# Patient Record
Sex: FEMALE | HISPANIC OR LATINO | Employment: UNEMPLOYED | URBAN - METROPOLITAN AREA
[De-identification: names, ages, dates, MRNs, and addresses within clinical notes are randomized per-mention and may not be internally consistent; named-entity substitution may affect disease eponyms.]

---

## 2017-11-29 ENCOUNTER — IMMUNIZATION (OUTPATIENT)
Dept: INTERNAL MEDICINE | Facility: CLINIC | Age: 32
End: 2017-11-29
Payer: COMMERCIAL

## 2017-11-29 ENCOUNTER — OFFICE VISIT (OUTPATIENT)
Dept: INTERNAL MEDICINE | Facility: CLINIC | Age: 32
End: 2017-11-29
Payer: COMMERCIAL

## 2017-11-29 VITALS
TEMPERATURE: 99 F | OXYGEN SATURATION: 99 % | HEIGHT: 66 IN | BODY MASS INDEX: 24.91 KG/M2 | DIASTOLIC BLOOD PRESSURE: 72 MMHG | SYSTOLIC BLOOD PRESSURE: 122 MMHG | HEART RATE: 65 BPM | WEIGHT: 155 LBS

## 2017-11-29 DIAGNOSIS — Z00.00 ANNUAL PHYSICAL EXAM: Primary | ICD-10-CM

## 2017-11-29 DIAGNOSIS — R93.89 ABNORMAL PELVIC ULTRASOUND: ICD-10-CM

## 2017-11-29 PROCEDURE — 99999 PR PBB SHADOW E&M-NEW PATIENT-LVL IV: CPT | Mod: PBBFAC,,, | Performed by: INTERNAL MEDICINE

## 2017-11-29 PROCEDURE — 99385 PREV VISIT NEW AGE 18-39: CPT | Mod: S$GLB,,, | Performed by: INTERNAL MEDICINE

## 2017-11-29 PROCEDURE — 90471 IMMUNIZATION ADMIN: CPT | Mod: S$GLB,,, | Performed by: INTERNAL MEDICINE

## 2017-11-29 PROCEDURE — 90686 IIV4 VACC NO PRSV 0.5 ML IM: CPT | Mod: S$GLB,,, | Performed by: INTERNAL MEDICINE

## 2017-11-29 NOTE — PROGRESS NOTES
Subjective:       Patient ID: Makenna Shelley is a 32 y.o. female.    Chief Complaint: Establish Care    HPI   Native [place].   Hx obtained through interpretor.  She feels well.    Has an allergy to animals.        Last pap about 1 year ago.  Was told then she may have had cyst or fibroid and f/u U/S recommended.  Normal menses.   has had vasectomy    Review of Systems   Constitutional: Negative for fever and unexpected weight change.   HENT: Negative for congestion and postnasal drip.    Respiratory: Negative for chest tightness, shortness of breath and wheezing.    Cardiovascular: Negative for chest pain and leg swelling.   Gastrointestinal: Negative for abdominal pain, anal bleeding, constipation, diarrhea, nausea and vomiting.   Genitourinary: Negative for dysuria and urgency.   Skin: Negative for rash.   Neurological: Negative for headaches.   Psychiatric/Behavioral: Negative for dysphoric mood and sleep disturbance. The patient is not nervous/anxious.        Objective:      Physical Exam   Constitutional: She is oriented to person, place, and time. She appears well-developed and well-nourished. No distress.   HENT:   Head: Normocephalic and atraumatic.   Right Ear: External ear normal.   Left Ear: External ear normal.   Nose: Nose normal.   Mouth/Throat: Oropharynx is clear and moist.   Eyes: Conjunctivae and EOM are normal. Pupils are equal, round, and reactive to light. Right eye exhibits no discharge. Left eye exhibits no discharge. No scleral icterus.   Neck: Normal range of motion. Neck supple. No JVD present. No thyromegaly present.   Cardiovascular: Normal rate, regular rhythm and normal heart sounds.  Exam reveals no gallop.    No murmur heard.  Pulmonary/Chest: Effort normal and breath sounds normal. No respiratory distress. She has no wheezes. She has no rales.   Abdominal: Soft. Bowel sounds are normal. She exhibits no distension and no mass. There is no tenderness. There is no rebound  and no guarding.   Musculoskeletal: Normal range of motion. She exhibits no edema or tenderness.   Lymphadenopathy:     She has no cervical adenopathy.   Neurological: She is alert and oriented to person, place, and time. No cranial nerve deficit. Coordination normal.   Skin: Skin is warm and dry. No rash noted.   Psychiatric: She has a normal mood and affect. Her behavior is normal. Judgment and thought content normal.       Assessment:       1. Annual physical exam    2. Abnormal pelvic ultrasound        Plan:       Makenna was seen today for establish care.    Diagnoses and all orders for this visit:    Annual physical exam  -     Comprehensive metabolic panel; Future  -     CBC auto differential; Future  -     Lipid panel; Future    Abnormal pelvic ultrasound  -     US Pelvis Complete Non OB; Future  -     Ambulatory consult to Gynecology       regular exercise.

## 2017-12-05 ENCOUNTER — HOSPITAL ENCOUNTER (OUTPATIENT)
Dept: RADIOLOGY | Facility: HOSPITAL | Age: 32
Discharge: HOME | End: 2017-12-05
Attending: INTERNAL MEDICINE
Payer: COMMERCIAL

## 2017-12-05 DIAGNOSIS — R93.89 ABNORMAL PELVIC ULTRASOUND: ICD-10-CM

## 2017-12-05 PROCEDURE — 76856 US EXAM PELVIC COMPLETE: CPT | Mod: 26,,, | Performed by: RADIOLOGY

## 2017-12-05 PROCEDURE — 76830 TRANSVAGINAL US NON-OB: CPT | Mod: 26,,, | Performed by: RADIOLOGY

## 2017-12-05 PROCEDURE — 76856 US EXAM PELVIC COMPLETE: CPT | Mod: TC

## 2017-12-12 ENCOUNTER — OFFICE VISIT (OUTPATIENT)
Dept: UROGYNECOLOGY | Facility: CLINIC | Age: 32
End: 2017-12-12
Payer: COMMERCIAL

## 2017-12-12 VITALS
WEIGHT: 155.19 LBS | HEIGHT: 60 IN | BODY MASS INDEX: 30.47 KG/M2 | DIASTOLIC BLOOD PRESSURE: 86 MMHG | SYSTOLIC BLOOD PRESSURE: 100 MMHG

## 2017-12-12 DIAGNOSIS — D25.9 UTERINE LEIOMYOMA, UNSPECIFIED LOCATION: ICD-10-CM

## 2017-12-12 DIAGNOSIS — Z12.4 ENCOUNTER FOR SCREENING FOR CERVICAL CANCER: ICD-10-CM

## 2017-12-12 DIAGNOSIS — Z01.419 WELL WOMAN EXAM: Primary | ICD-10-CM

## 2017-12-12 PROCEDURE — 99999 PR PBB SHADOW E&M-EST. PATIENT-LVL III: CPT | Mod: PBBFAC,,, | Performed by: NURSE PRACTITIONER

## 2017-12-12 PROCEDURE — 87624 HPV HI-RISK TYP POOLED RSLT: CPT

## 2017-12-12 PROCEDURE — 88175 CYTOPATH C/V AUTO FLUID REDO: CPT | Performed by: PATHOLOGY

## 2017-12-12 PROCEDURE — 88141 CYTOPATH C/V INTERPRET: CPT | Mod: ,,, | Performed by: PATHOLOGY

## 2017-12-12 PROCEDURE — 99385 PREV VISIT NEW AGE 18-39: CPT | Mod: S$GLB,,, | Performed by: NURSE PRACTITIONER

## 2017-12-12 NOTE — PATIENT INSTRUCTIONS
1. Well woman  --pap today  --takes 2-3 weeks for results    2. Uterine fibroid  -- no issue at this time    3. RTC 1 year for annual

## 2017-12-12 NOTE — PROGRESS NOTES
2017    SUBJECTIVE:   32 y.o. female for annual exam.    History reviewed. No pertinent past medical history.    History reviewed. No pertinent surgical history.    Current Outpatient Prescriptions   Medication Sig Dispense Refill    DILTIAZEM HCL (DILTIAZEM 2% CREAM) APPLY TOPICALLY TO ANAL AREA THREE TIMES DAILY.  0     No current facility-administered medications for this visit.      Allergies: Patient has no known allergies.   Patient's last menstrual period was 2017. Normal cycles lasting approximately 5 days.  Denies menorrhagia.   had vasectomy.    OB History      Para Term  AB Living    0 0 0 0 0 0    SAB TAB Ectopic Multiple Live Births    0 0 0 0 0            Well Woman:  Pap:Denies history of abnormal pap smear.  Last pap one year ago  Mammo:n/a  Colonoscopy:n/a  Dexa:n/a      2017  The uterus is normal in size measuring 8.1 x 4.3 x 5.0 cm.  There is a 3.2 x 2.8 x 2.8 cm heterogeneous mass within the left lateral myometrium of the uterine body with displacement of the endometrium.  No additional uterine masses.  The endometrium is normal in thickness measuring 2 mm.  The right ovary measures 4.3 x 2.3 x 2.4 cm with normal blood flow.The left ovary measures 4.2 x 1.7 x 2.6 cm with normal blood flow.  No ovarian masses.  Follicles in both ovaries with dominant follicle in the right measuring 1.6 cm.  There is no free pelvic fluid.  Impression  3.2 cm uterine mass, as above, likely representing a fibroid.  Otherwise unremarkable pelvic ultrasound    Family History  Family History   Problem Relation Age of Onset    Cancer Father      stomach    No Known Problems Sister     No Known Problems Brother     Cervical cancer Neg Hx     Endometrial cancer Neg Hx     Vaginal cancer Neg Hx     Ovarian cancer Neg Hx       Colon CA: No  Breast CA: No  GYN CA: No   CA: No      ROS:  Feeling well.   No dyspnea or chest pain on exertion.    No abdominal pain, change in bowel  habits, black or bloody stools.  + constipation.  No urinary tract symptoms. Reports she gets yeast infections often.  GYN ROS: normal menses, no abnormal bleeding, pelvic pain or discharge, no breast pain or new or enlarging lumps on self exam. No neurological complaints.    OBJECTIVE:   The patient appears well, alert, oriented x 3, in no distress.  /86 (BP Location: Right arm, Patient Position: Sitting)   Ht 5' (1.524 m)   Wt 70.4 kg (155 lb 3.3 oz)   LMP 11/28/2017   BMI 30.31 kg/m²   ENT normal.  Neck supple. No adenopathy or thyromegaly. SAIGE.   Lungs are clear, good air entry, no wheezes, rhonchi or rales.   S1 and S2 normal, no murmurs, regular rate and rhythm.   Abdomen soft without tenderness, guarding, mass or organomegaly.   Extremities show no edema, normal peripheral pulses.   Neurological is normal, no focal findings.    BREAST EXAM: breasts appear normal, no suspicious masses, no skin or nipple changes or axillary nodes    PELVIC EXAM:   VULVA: normal appearing vulva with no masses, tenderness or lesions,   VAGINA: normal appearing vagina with normal color and discharge, no lesions,  CERVIX: normal appearing cervix without discharge or lesions,   UTERUS: enlarged to 8 week's size,   ADNEXA: no masses,       ASSESSMENT:   1. Well woman exam     2. Encounter for screening for cervical cancer   HPV High Risk Genotypes, PCR    Liquid-based pap smear, screening   3. Uterine leiomyoma, unspecified location         PLAN:     1. Well woman  --pap today  --takes 2-3 weeks for results    2. Uterine fibroid  -- no issue at this time    3. RTC 1 year for annual      Wendy  was present.      30 minutes were spent in face to face time with this patient  75 % of this time was spent in counseling and/or coordination of care  Elda ORO [facility]  Division of Female Pelvic Medicine and Reconstructive Surgery  Department of Obstetrics & Gynecology

## 2017-12-15 LAB
HPV16 AG SPEC QL: NEGATIVE
HPV16+18+H RISK 12 DNA CVX-IMP: NEGATIVE
HPV18 DNA SPEC QL NAA+PROBE: NEGATIVE

## 2017-12-26 ENCOUNTER — TELEPHONE (OUTPATIENT)
Dept: UROGYNECOLOGY | Facility: CLINIC | Age: 32
End: 2017-12-26

## 2017-12-26 NOTE — TELEPHONE ENCOUNTER
Attempted to reach pt regarding normal result. Pt don't understand english she speaks Nepalese only.